# Patient Record
Sex: MALE | Race: WHITE | NOT HISPANIC OR LATINO
[De-identification: names, ages, dates, MRNs, and addresses within clinical notes are randomized per-mention and may not be internally consistent; named-entity substitution may affect disease eponyms.]

---

## 2019-01-30 PROBLEM — Z00.00 ENCOUNTER FOR PREVENTIVE HEALTH EXAMINATION: Status: ACTIVE | Noted: 2019-01-30

## 2019-02-01 ENCOUNTER — RECORD ABSTRACTING (OUTPATIENT)
Age: 59
End: 2019-02-01

## 2019-02-01 DIAGNOSIS — R14.0 ABDOMINAL DISTENSION (GASEOUS): ICD-10-CM

## 2019-02-01 DIAGNOSIS — Z86.79 PERSONAL HISTORY OF OTHER DISEASES OF THE CIRCULATORY SYSTEM: ICD-10-CM

## 2019-02-01 DIAGNOSIS — R93.89 ABNORMAL FINDINGS ON DIAGNOSTIC IMAGING OF OTHER SPECIFIED BODY STRUCTURES: ICD-10-CM

## 2019-02-01 DIAGNOSIS — Z86.39 PERSONAL HISTORY OF OTHER ENDOCRINE, NUTRITIONAL AND METABOLIC DISEASE: ICD-10-CM

## 2019-02-01 DIAGNOSIS — R10.32 LEFT LOWER QUADRANT PAIN: ICD-10-CM

## 2019-02-01 DIAGNOSIS — Z78.9 OTHER SPECIFIED HEALTH STATUS: ICD-10-CM

## 2019-02-01 RX ORDER — MONTELUKAST SODIUM 10 MG/1
10 TABLET, FILM COATED ORAL
Refills: 0 | Status: ACTIVE | COMMUNITY

## 2019-02-01 RX ORDER — TIOTROPIUM BROMIDE INHALATION SPRAY 3.12 UG/1
2.5 SPRAY, METERED RESPIRATORY (INHALATION) DAILY
Refills: 0 | Status: ACTIVE | COMMUNITY

## 2019-02-01 RX ORDER — METFORMIN HYDROCHLORIDE 1000 MG/1
1000 TABLET, COATED ORAL
Refills: 0 | Status: ACTIVE | COMMUNITY

## 2019-02-01 RX ORDER — ATORVASTATIN CALCIUM 40 MG/1
40 TABLET, FILM COATED ORAL DAILY
Refills: 0 | Status: ACTIVE | COMMUNITY

## 2019-02-01 RX ORDER — ALBUTEROL SULFATE 90 UG/1
108 (90 BASE) AEROSOL, METERED RESPIRATORY (INHALATION)
Refills: 0 | Status: ACTIVE | COMMUNITY

## 2019-02-01 RX ORDER — TAPENTADOL HYDROCHLORIDE 75 MG/1
75 TABLET, FILM COATED ORAL
Refills: 0 | Status: ACTIVE | COMMUNITY

## 2019-02-01 RX ORDER — FLUTICASONE PROPIONATE AND SALMETEROL 50; 500 UG/1; UG/1
500-50 POWDER RESPIRATORY (INHALATION) TWICE DAILY
Refills: 0 | Status: ACTIVE | COMMUNITY

## 2019-02-01 RX ORDER — TRIMETHOBENZAMIDE HYDROCHLORIDE 300 MG/1
300 CAPSULE ORAL 3 TIMES DAILY
Refills: 0 | Status: ACTIVE | COMMUNITY

## 2019-02-01 RX ORDER — PANTOPRAZOLE 40 MG/1
40 TABLET, DELAYED RELEASE ORAL DAILY
Refills: 0 | Status: ACTIVE | COMMUNITY

## 2019-02-01 RX ORDER — NITROGLYCERIN 0.4 MG/1
0.4 TABLET SUBLINGUAL
Refills: 0 | Status: ACTIVE | COMMUNITY

## 2019-02-01 RX ORDER — KETOROLAC TROMETHAMINE 4 MG/ML
0.4 SOLUTION/ DROPS OPHTHALMIC 4 TIMES DAILY
Refills: 0 | Status: ACTIVE | COMMUNITY

## 2019-02-06 ENCOUNTER — TRANSCRIPTION ENCOUNTER (OUTPATIENT)
Age: 59
End: 2019-02-06

## 2019-02-06 ENCOUNTER — APPOINTMENT (OUTPATIENT)
Dept: GASTROENTEROLOGY | Facility: CLINIC | Age: 59
End: 2019-02-06
Payer: MEDICARE

## 2019-02-06 VITALS
HEART RATE: 74 BPM | DIASTOLIC BLOOD PRESSURE: 80 MMHG | BODY MASS INDEX: 31.83 KG/M2 | SYSTOLIC BLOOD PRESSURE: 128 MMHG | HEIGHT: 68 IN | WEIGHT: 210 LBS

## 2019-02-06 PROCEDURE — 99214 OFFICE O/P EST MOD 30 MIN: CPT

## 2019-02-06 RX ORDER — SAXAGLIPTIN 5 MG/1
5 TABLET, FILM COATED ORAL
Refills: 0 | Status: DISCONTINUED | COMMUNITY
End: 2019-02-06

## 2019-02-06 RX ORDER — FEXOFENADINE HYDROCHLORIDE 180 MG/1
180 TABLET ORAL
Refills: 0 | Status: DISCONTINUED | COMMUNITY
End: 2019-02-06

## 2019-02-06 RX ORDER — GLIPIZIDE 5 MG/1
5 TABLET ORAL
Refills: 0 | Status: ACTIVE | COMMUNITY

## 2019-02-06 RX ORDER — DICYCLOMINE HYDROCHLORIDE 10 MG/5ML
SOLUTION ORAL
Refills: 0 | Status: DISCONTINUED | COMMUNITY
End: 2019-02-06

## 2019-02-06 RX ORDER — RIFAXIMIN 550 MG/1
550 TABLET ORAL
Refills: 0 | Status: DISCONTINUED | COMMUNITY
End: 2019-02-06

## 2019-02-06 RX ORDER — CETIRIZINE HYDROCHLORIDE 10 MG/1
10 TABLET, FILM COATED ORAL
Refills: 0 | Status: ACTIVE | COMMUNITY

## 2019-02-06 RX ORDER — FLUTICASONE FUROATE AND VILANTEROL TRIFENATATE 50; 25 UG/1; UG/1
POWDER RESPIRATORY (INHALATION)
Refills: 0 | Status: DISCONTINUED | COMMUNITY
End: 2019-02-06

## 2019-02-06 RX ORDER — DOMPERIDONE
POWDER (GRAM) MISCELLANEOUS
Refills: 0 | Status: DISCONTINUED | COMMUNITY
End: 2019-02-06

## 2019-02-06 RX ORDER — AZELASTINE HYDROCHLORIDE AND FLUTICASONE PROPIONATE 137; 50 UG/1; UG/1
137-50 SPRAY, METERED NASAL
Refills: 0 | Status: ACTIVE | COMMUNITY

## 2019-02-06 RX ORDER — VALSARTAN AND HYDROCHLOROTHIAZIDE 160; 12.5 MG/1; MG/1
160-12.5 TABLET, FILM COATED ORAL
Refills: 0 | Status: ACTIVE | COMMUNITY

## 2019-02-06 RX ORDER — ALBUTEROL SULFATE 1.25 MG/3ML
1.25 SOLUTION RESPIRATORY (INHALATION)
Refills: 0 | Status: ACTIVE | COMMUNITY

## 2019-02-06 NOTE — HISTORY OF PRESENT ILLNESS
[de-identified] : Called with a complaint of severe  / intermittent LUQ abdominal pain  x 1 year. The pain is associated with position changes and radiates to / from his back. Has a known h/o lumbar / cervical back disease.  Denies nausea, vomiting, fever, chills, diarrhea, constipation, melena, hematemesis. Pain mild at present.  Labs faxed to the office were reviewed by me and were notable for a normal amylase  ( 1/2019, 12/2018 / 4/2018). Elevated Lipase = 58 ( 1/2019), 92 ( 12/2018), 216 ( 4/2018). Normal AST / ALT / ALK phos / bilirubin ( 1/2019 / 12/2018 / 10/2018) .   Normal  ( 12/2018). Normal CBC. Elevated HgA1c ( 7.4). BUN / Cr = 11/1 and 17/ 1.4( normal) . Normal CRP ( 12/31/18). Admits to excessive ETOH intake in 12/2018 immediately prior to daughters brain surgery. Abstinent for the past month or so.\par Seen in 2017 with  a complaint of bloating / RLQ pain for  4-5 months (noticed only when lying on rt. side). CCK-HIDA ( 5/2017)  revealed non emptying gallbladder at 30 min.  EGD ( 4/2017)  notable for Gastritis / esophagitis.  Sonogram ( 4/2017)  revealed a fatty liver, prominent GB and a renal cyst ( yanely lymph nodes seen on CT scan not assessed on this sonogram).   CT scan  ( 2/2017)  fatty liver / enlarged lymph nodes in the yanely / distended GB. Additionally diagnosed with gastroparesis ( T 1/2 = 343) started on domperidone by Dr. Schoen ( GI) and SIBO (small intestinal bacterial overgrowth). For his SIBO he was treated with Xifaxan 550 po TID for 2 weeks with good response. Placed on Xifaxan 550 po TID daily for the last 3 months when his sxs returned. Labs from 1/29/17 were unremarkable. Seen in 1/2016 with a 1 year c/o intermittent LLQ pain radiating to the left lower back with a burning sensation radiating down his left leg. Evaluation revealed a musculoskeletal etiology ( relieved with an epidural) . Colonoscopy in 3/2017 revealed diverticulosis and internal hemorrhoids. \par \par

## 2019-02-06 NOTE — PHYSICAL EXAM
[General Appearance - Alert] : alert [General Appearance - In No Acute Distress] : in no acute distress [Sclera] : the sclera and conjunctiva were normal [Outer Ear] : the ears and nose were normal in appearance [Neck Appearance] : the appearance of the neck was normal [] : no respiratory distress [Abdomen Soft] : soft [FreeTextEntry1] : deferred [No CVA Tenderness] : no ~M costovertebral angle tenderness [Abnormal Walk] : normal gait [Skin Color & Pigmentation] : normal skin color and pigmentation [No Focal Deficits] : no focal deficits [Oriented To Time, Place, And Person] : oriented to person, place, and time

## 2019-02-06 NOTE — ASSESSMENT
[FreeTextEntry1] : 1. LUQ Flank / Abdominal pain: Diagnosed with both gastroparesis and biliary dyskinesia in the past. However pain is in Left flank radiating to back and appears positional. Suspect musculoskeletal etiology. Recommended f/u with his back surgeon for evaluation\par \par

## 2019-02-06 NOTE — CONSULT LETTER
[Dear  ___] : Dear  [unfilled], [Courtesy Letter:] : I had the pleasure of seeing your patient, [unfilled], in my office today. [Please see my note below.] : Please see my note below. [Consult Closing:] : Thank you very much for allowing me to participate in the care of this patient.  If you have any questions, please do not hesitate to contact me. [Sincerely,] : Sincerely, [FreeTextEntry3] : Axel Ibarra MD\par tel: [phone]\par fax: [phone]\par

## 2019-08-05 PROBLEM — R10.32 LEFT LOWER QUADRANT PAIN: Status: ACTIVE | Noted: 2019-02-01

## 2023-03-03 ENCOUNTER — NON-APPOINTMENT (OUTPATIENT)
Age: 63
End: 2023-03-03

## 2023-03-03 ENCOUNTER — APPOINTMENT (OUTPATIENT)
Dept: GASTROENTEROLOGY | Facility: CLINIC | Age: 63
End: 2023-03-03
Payer: COMMERCIAL

## 2023-03-03 VITALS
OXYGEN SATURATION: 97 % | SYSTOLIC BLOOD PRESSURE: 132 MMHG | BODY MASS INDEX: 18.4 KG/M2 | HEART RATE: 56 BPM | WEIGHT: 121 LBS | DIASTOLIC BLOOD PRESSURE: 84 MMHG

## 2023-03-03 DIAGNOSIS — R10.9 UNSPECIFIED ABDOMINAL PAIN: ICD-10-CM

## 2023-03-03 DIAGNOSIS — R11.0 NAUSEA: ICD-10-CM

## 2023-03-03 DIAGNOSIS — K59.09 OTHER CONSTIPATION: ICD-10-CM

## 2023-03-03 PROCEDURE — 99205 OFFICE O/P NEW HI 60 MIN: CPT

## 2023-03-03 RX ORDER — ALUMINUM HYDROXIDE AND MAGNESIUM TRISILICATE 80; 14.2 MG/1; MG/1
80-14.2 TABLET, CHEWABLE ORAL
Refills: 0 | Status: DISCONTINUED | COMMUNITY
End: 2023-03-03

## 2023-03-03 RX ORDER — DILTIAZEM HYDROCHLORIDE 360 MG/1
360 CAPSULE, EXTENDED RELEASE ORAL
Refills: 0 | Status: DISCONTINUED | COMMUNITY
End: 2023-03-03

## 2023-03-03 NOTE — HISTORY OF PRESENT ILLNESS
[de-identified] : Presents  with a 2.5  month complaint of  increasing constipation ( small  / hard stools)  LUQ pain radiating to left back / intermittent nausea. Denies melena, hematemesis, BRBPR, emesis.  Currently ETOH abstinebt x 4 years. Diabetes poorly controlled at present. \par \par Labs reviewed from 3/1/23 notable  for  Normal BUN / Cr, elevated glucose / lpase 247 / Amylase 131 /A1c = 9.2 / TB = 1.5 / normal ALK phos, AST, ALT\par \par Last evaluated in 2019 for  a complaint of severe  / intermittent LUQ abdominal pain  x 1 year. The pain was  associated with position changes and radiated to / from his back. Has a known h/o lumbar / cervical back disease.  Back evaluation recommended\par \par Denies nausea, vomiting, fever, chills, diarrhea, constipation, melena, hematemesis. \par \par  Labs faxed to the office previously were reviewed by me and were notable for a normal amylase  ( 1/2019, 12/2018 / 4/2018). Elevated Lipase = 58 ( 1/2019), 92 ( 12/2018), 216 ( 4/2018). Normal AST / ALT / ALK phos / bilirubin ( 1/2019 / 12/2018 / 10/2018) .   Normal  ( 12/2018). Normal CBC. Elevated HgA1c ( 7.4). BUN / Cr = 11/1 and 17/ 1.4( normal) . Normal CRP ( 12/31/18). Admits to excessive ETOH intake in 12/2018 immediately prior to daughters brain surgery.\par \par Seen in 2017 with  a complaint of bloating / RLQ pain for  4-5 months (noticed only when lying on rt. side). \par \par -CCK-HIDA ( 5/2017)  revealed non emptying gallbladder at 30 min.  \par -EGD ( 4/2017)  notable for Gastritis / esophagitis.  \par -Sonogram ( 4/2017)  revealed a fatty liver, prominent GB and a renal cyst ( yanely lymph nodes seen on CT scan not assessed on this sonogram).  \par - CT scan  ( 2/2017)  fatty liver / enlarged lymph nodes in the yanely / distended GB. \par \par Additionally diagnosed with gastroparesis ( T 1/2 = 343) started on domperidone by Dr. Schoen (GI) and JOSE ROBERTO. For his SIBO he was treated with Xifaxan 550 po TID for 2 weeks with good response. . Labs from 1/29/17 were unremarkable. \par \par Seen in 1/2016 with a 1 year c/o intermittent LLQ pain radiating to the left lower back with a burning sensation radiating down his left leg. Evaluation revealed a musculoskeletal etiology ( relieved with an epidural) . \par \par -Colonoscopy in 3/2017 revealed diverticulosis and internal hemorrhoids. \par \par

## 2023-03-03 NOTE — CONSULT LETTER
[Dear  ___] : Dear  [unfilled], [Courtesy Letter:] : I had the pleasure of seeing your patient, [unfilled], in my office today. [Please see my note below.] : Please see my note below. [Consult Closing:] : Thank you very much for allowing me to participate in the care of this patient.  If you have any questions, please do not hesitate to contact me. [Sincerely,] : Sincerely, [FreeTextEntry3] : Axel Ibarra MD\par tel: [phone]\par fax: [phone]\par

## 2023-03-03 NOTE — ASSESSMENT
[FreeTextEntry1] : 1. LUQ Flank / Abdominal pain: Diagnosed with both gastroparesis and biliary dyskinesia in the past.  CT planed\par \par 2. Constipation ( new):  Colonoscopy scheduled\par \par 3. Nausea: EGD planned ...if unremarkable  with consider repeat GES and CCK - HIDA\par \par Pertinent available records reviewed\par Risks of the procedures including but not limited to bleeding / perforation / infection / anesthesia complication / missed  lesions explained to the  patient . The patient expressed understanding and a desire to proceed with the procedures.\par \par Risk of not doing procedures includes but is not limited to missed or delayed diagnosis of gastric pathology, colon cancer or other gastrointestinal pathology\par \par The patient is at increased risk of anesthesia / procedure related complications  secondary to DM / elevated BMI\par Pertinent available records reviewed\par

## 2023-03-22 ENCOUNTER — RESULT REVIEW (OUTPATIENT)
Age: 63
End: 2023-03-22

## 2023-03-23 NOTE — REVIEW OF SYSTEMS
[Joint Pain] : joint pain [Negative] : Heme/Lymph Our Emergency Department Referral Coordinators will be reaching out to you in the next 24-48 hours from 9:00am to 5:00pm with a follow up appointment. Please expect a phone call from the hospital in that time frame. If you do not receive a call or if you have any questions or concerns, you can reach them at   [phone]      Diverticulitis    Diverticulitis is inflammation or infection of small pouches in your colon that form when you HAVE a condition called diverticulosis. This condition can range from mild to severe potentially leading to perforation or obstructions of your colon. Symptoms include abdominal pain, fever/chills, nausea, vomiting, diarrhea, constipation, or blood in your stool. If you were prescribed an antibiotic medicine, take it as told by your health care provider. Do not stop taking the antibiotic even if you start to feel better.    SEEK IMMEDIATE MEDICAL CARE IF YOU HAVE ANY OF THE FOLLOWING SYMPTOMS: worsening abdominal pain, high fever, inability to hold down liquids or medication, black or bloody stools, inability to pass gas, lightheadedness/dizziness, or a change in mental status.

## 2023-03-25 ENCOUNTER — RESULT REVIEW (OUTPATIENT)
Age: 63
End: 2023-03-25

## 2023-03-28 ENCOUNTER — NON-APPOINTMENT (OUTPATIENT)
Age: 63
End: 2023-03-28

## 2023-04-17 ENCOUNTER — RESULT REVIEW (OUTPATIENT)
Age: 63
End: 2023-04-17

## 2023-04-18 ENCOUNTER — APPOINTMENT (OUTPATIENT)
Dept: GASTROENTEROLOGY | Facility: HOSPITAL | Age: 63
End: 2023-04-18

## 2024-10-04 ENCOUNTER — APPOINTMENT (OUTPATIENT)
Dept: GASTROENTEROLOGY | Facility: CLINIC | Age: 64
End: 2024-10-04
Payer: COMMERCIAL

## 2024-10-04 VITALS
HEIGHT: 65 IN | DIASTOLIC BLOOD PRESSURE: 80 MMHG | WEIGHT: 225 LBS | SYSTOLIC BLOOD PRESSURE: 130 MMHG | BODY MASS INDEX: 37.49 KG/M2

## 2024-10-04 DIAGNOSIS — R11.0 NAUSEA: ICD-10-CM

## 2024-10-04 DIAGNOSIS — R10.9 UNSPECIFIED ABDOMINAL PAIN: ICD-10-CM

## 2024-10-04 DIAGNOSIS — K31.84 GASTROPARESIS: ICD-10-CM

## 2024-10-04 PROCEDURE — 99215 OFFICE O/P EST HI 40 MIN: CPT

## 2024-10-04 PROCEDURE — G2211 COMPLEX E/M VISIT ADD ON: CPT | Mod: NC

## 2024-10-04 RX ORDER — INSULIN ASPART 100 [IU]/ML
INJECTION, SOLUTION INTRAVENOUS; SUBCUTANEOUS
Refills: 0 | Status: ACTIVE | COMMUNITY

## 2024-10-04 RX ORDER — ROSUVASTATIN CALCIUM 5 MG/1
TABLET, FILM COATED ORAL
Refills: 0 | Status: ACTIVE | COMMUNITY

## 2024-10-04 NOTE — PHYSICAL EXAM
[Alert] : alert [Sclera] : the sclera and conjunctiva were normal [Normal Appearance] : the appearance of the neck was normal [No Respiratory Distress] : no respiratory distress [Abdomen Soft] : soft [Abnormal Walk] : normal gait [Normal Color / Pigmentation] : normal skin color and pigmentation [Cranial Nerves Intact] : cranial nerves 2-12 were intact [Oriented To Time, Place, And Person] : oriented to person, place, and time

## 2024-10-04 NOTE — HISTORY OF PRESENT ILLNESS
[FreeTextEntry1] : Presents with a 2-3 month concern re: decreased energy. Started after doubling his BP meds. Also with nausea and LUQ pain radiating to groin and back ( increased with movement / most noticeable after sleeping on left side) . Denies melena, hematemesis, fever, chills, diarrhea, constipation  Last evaluated 5/2023 with a 2.5 month complaint of increasing constipation ( small / hard stools) LUQ pain radiating to left back / intermittent nausea. Denied melena, hematemesis, BRBPR, emesis.  Diabetes poorly controlled at present.  CT IMPRESSION: 3/2023  Gallbladder is dilated up to 11 cm in long axis x 6 cm in short axis, similar in degree to that evident on prior CT of 2/21/2017. No intraluminal calcifications or pericholecystic fluid. No biliary duct dilatation.  Large amount of fecal material throughout transverse colon and descending colon without evidence of bowel obstruction.  - EGD / colonoscopy 4/2023: esophagitis / gastritis / hemorrhoids / diverticulosis   Labs reviewed from 3/1/23 notable for Normal BUN / Cr, elevated glucose / lpase 247 / Amylase 131 /A1c = 9.2 / TB = 1.5 / normal ALK phos, AST, ALT  Evaluated in 2019 for a complaint of severe / intermittent LUQ abdominal pain x 1 year. The pain was associated with position changes and radiated to / from his back. Has a known h/o lumbar / cervical back disease. Back evaluation recommended   Labs faxed to the office previously were reviewed by me and were notable for a normal amylase ( 1/2019, 12/2018 / 4/2018). Elevated Lipase = 58 ( 1/2019), 92 ( 12/2018), 216 ( 4/2018). Normal AST / ALT / ALK phos / bilirubin ( 1/2019 / 12/2018 / 10/2018). Normal  ( 12/2018). Normal CBC. Elevated HgA1c ( 7.4). BUN / Cr = 11/1 and 17/ 1.4( normal). Normal CRP ( 12/31/18). Admits to excessive ETOH intake in 12/2018 immediately prior to daughters brain surgery.   -CCK-HIDA ( 5/2017) revealed non emptying gallbladder at 30 min. ( cholecystectomy recommended then)   -EGD ( 4/2017) notable for Gastritis / esophagitis.  -Sonogram ( 4/2017) revealed a fatty liver, prominent GB and a renal cyst ( yanely lymph nodes seen on CT scan not assessed on this sonogram).  - CT scan ( 2/2017) fatty liver / enlarged lymph nodes in the yanely / distended GB.   Additionally diagnosed with gastroparesis ( T 1/2 = 343) started on domperidone by Dr. Schoen (GI) and SIBO. For his SIBO he was treated with Xifaxan 550 po TID for 2 weeks with good response.. Labs from 1/29/17 were unremarkable.  Seen in 1/2016 with a 1 year c/o intermittent LLQ pain radiating to the left lower back with a burning sensation radiating down his left leg. Evaluation revealed a musculoskeletal etiology ( relieved with an epidural).  -Colonoscopy in 3/2017 revealed diverticulosis and internal hemorrhoids.

## 2024-10-04 NOTE — ASSESSMENT
[FreeTextEntry1] : 1. LUQ pain:  probably musculoskeletal. Recommended re-evaluation with his back MD  2. Post prandial nausea:  gastroparesis vs meds...will repeat GES   3. Decreased energy since increasing BP meds...patient colby discus with his PMD  Pertinent available records reviewed .

## 2024-10-29 ENCOUNTER — RESULT REVIEW (OUTPATIENT)
Age: 64
End: 2024-10-29

## 2024-11-01 ENCOUNTER — NON-APPOINTMENT (OUTPATIENT)
Age: 64
End: 2024-11-01

## 2024-11-01 DIAGNOSIS — R10.9 UNSPECIFIED ABDOMINAL PAIN: ICD-10-CM

## 2024-12-11 ENCOUNTER — RESULT REVIEW (OUTPATIENT)
Age: 64
End: 2024-12-11

## 2024-12-11 ENCOUNTER — NON-APPOINTMENT (OUTPATIENT)
Age: 64
End: 2024-12-11

## 2024-12-18 ENCOUNTER — APPOINTMENT (OUTPATIENT)
Dept: SURGERY | Facility: CLINIC | Age: 64
End: 2024-12-18
Payer: COMMERCIAL

## 2024-12-18 VITALS
TEMPERATURE: 97.9 F | HEIGHT: 68 IN | SYSTOLIC BLOOD PRESSURE: 150 MMHG | DIASTOLIC BLOOD PRESSURE: 87 MMHG | HEART RATE: 68 BPM | BODY MASS INDEX: 35.92 KG/M2 | OXYGEN SATURATION: 97 % | WEIGHT: 237 LBS

## 2024-12-18 DIAGNOSIS — R10.9 UNSPECIFIED ABDOMINAL PAIN: ICD-10-CM

## 2024-12-18 DIAGNOSIS — R11.0 NAUSEA: ICD-10-CM

## 2024-12-18 PROCEDURE — 99204 OFFICE O/P NEW MOD 45 MIN: CPT

## 2025-01-08 ENCOUNTER — RESULT REVIEW (OUTPATIENT)
Age: 65
End: 2025-01-08

## 2025-01-10 ENCOUNTER — TRANSCRIPTION ENCOUNTER (OUTPATIENT)
Age: 65
End: 2025-01-10

## 2025-01-15 ENCOUNTER — APPOINTMENT (OUTPATIENT)
Dept: SURGERY | Facility: CLINIC | Age: 65
End: 2025-01-15

## 2025-01-16 ENCOUNTER — APPOINTMENT (OUTPATIENT)
Dept: ORTHOPEDIC SURGERY | Facility: CLINIC | Age: 65
End: 2025-01-16
Payer: COMMERCIAL

## 2025-01-16 PROBLEM — M54.16 LUMBAR RADICULOPATHY, ACUTE: Status: ACTIVE | Noted: 2025-01-16

## 2025-01-16 PROBLEM — M43.10 SPONDYLOLISTHESIS, ACQUIRED: Status: ACTIVE | Noted: 2025-01-16

## 2025-01-16 PROCEDURE — 99205 OFFICE O/P NEW HI 60 MIN: CPT

## 2025-01-22 ENCOUNTER — APPOINTMENT (OUTPATIENT)
Dept: NEUROSURGERY | Facility: CLINIC | Age: 65
End: 2025-01-22
Payer: COMMERCIAL

## 2025-01-22 ENCOUNTER — NON-APPOINTMENT (OUTPATIENT)
Age: 65
End: 2025-01-22

## 2025-01-22 VITALS — DIASTOLIC BLOOD PRESSURE: 80 MMHG | SYSTOLIC BLOOD PRESSURE: 122 MMHG | OXYGEN SATURATION: 94 % | HEART RATE: 62 BPM

## 2025-01-22 DIAGNOSIS — M54.16 RADICULOPATHY, LUMBAR REGION: ICD-10-CM

## 2025-01-22 DIAGNOSIS — M43.10 SPONDYLOLISTHESIS, SITE UNSPECIFIED: ICD-10-CM

## 2025-01-22 DIAGNOSIS — M47.27 OTHER SPONDYLOSIS WITH RADICULOPATHY, LUMBOSACRAL REGION: ICD-10-CM

## 2025-01-22 PROCEDURE — 99205 OFFICE O/P NEW HI 60 MIN: CPT

## 2025-01-23 RX ORDER — OXYCODONE 5 MG/1
5 TABLET ORAL
Qty: 60 | Refills: 0 | Status: ACTIVE | COMMUNITY
Start: 2025-01-23 | End: 1900-01-01

## 2025-01-23 RX ORDER — TIZANIDINE 4 MG/1
4 TABLET ORAL 3 TIMES DAILY
Qty: 30 | Refills: 2 | Status: ACTIVE | COMMUNITY
Start: 2025-01-23 | End: 1900-01-01

## 2025-01-23 RX ORDER — GABAPENTIN 300 MG/1
300 CAPSULE ORAL
Qty: 30 | Refills: 4 | Status: ACTIVE | COMMUNITY
Start: 2025-01-23 | End: 1900-01-01

## 2025-01-29 ENCOUNTER — RESULT REVIEW (OUTPATIENT)
Age: 65
End: 2025-01-29

## 2025-02-12 ENCOUNTER — APPOINTMENT (OUTPATIENT)
Dept: NEUROSURGERY | Facility: HOSPITAL | Age: 65
End: 2025-02-12

## 2025-02-12 ENCOUNTER — APPOINTMENT (OUTPATIENT)
Dept: ORTHOPEDIC SURGERY | Facility: HOSPITAL | Age: 65
End: 2025-02-12

## 2025-02-12 ENCOUNTER — RESULT REVIEW (OUTPATIENT)
Age: 65
End: 2025-02-12

## 2025-02-12 PROCEDURE — 22853 INSJ BIOMECHANICAL DEVICE: CPT

## 2025-02-12 PROCEDURE — 22633 ARTHRD CMBN 1NTRSPC LUMBAR: CPT | Mod: 62

## 2025-02-12 PROCEDURE — 22840 INSERT SPINE FIXATION DEVICE: CPT

## 2025-02-12 PROCEDURE — 63047 LAM FACETEC & FORAMOT LUMBAR: CPT | Mod: 62

## 2025-02-12 PROCEDURE — 61783 SCAN PROC SPINAL: CPT | Mod: 59

## 2025-02-12 PROCEDURE — 63048 LAM FACETEC &FORAMOT EA ADDL: CPT | Mod: 62

## 2025-02-12 PROCEDURE — 63052 LAM FACETC/FRMT ARTHRD LUM 1: CPT | Mod: 59

## 2025-02-13 ENCOUNTER — RESULT REVIEW (OUTPATIENT)
Age: 65
End: 2025-02-13

## 2025-02-15 ENCOUNTER — TRANSCRIPTION ENCOUNTER (OUTPATIENT)
Age: 65
End: 2025-02-15

## 2025-02-24 RX ORDER — DIAZEPAM 5 MG/1
5 TABLET ORAL
Qty: 30 | Refills: 0 | Status: ACTIVE | COMMUNITY
Start: 2025-02-24 | End: 1900-01-01

## 2025-03-05 ENCOUNTER — APPOINTMENT (OUTPATIENT)
Facility: CLINIC | Age: 65
End: 2025-03-05
Payer: COMMERCIAL

## 2025-03-05 ENCOUNTER — RESULT REVIEW (OUTPATIENT)
Age: 65
End: 2025-03-05

## 2025-03-05 VITALS
BODY MASS INDEX: 33.65 KG/M2 | HEIGHT: 68 IN | HEART RATE: 70 BPM | WEIGHT: 222 LBS | SYSTOLIC BLOOD PRESSURE: 120 MMHG | DIASTOLIC BLOOD PRESSURE: 80 MMHG | OXYGEN SATURATION: 95 %

## 2025-03-05 DIAGNOSIS — M43.10 SPONDYLOLISTHESIS, SITE UNSPECIFIED: ICD-10-CM

## 2025-03-05 DIAGNOSIS — M54.16 RADICULOPATHY, LUMBAR REGION: ICD-10-CM

## 2025-03-05 PROCEDURE — 72100 X-RAY EXAM L-S SPINE 2/3 VWS: CPT | Mod: 26

## 2025-03-05 PROCEDURE — 99024 POSTOP FOLLOW-UP VISIT: CPT

## 2025-04-30 DIAGNOSIS — R19.7 DIARRHEA, UNSPECIFIED: ICD-10-CM

## 2025-05-07 ENCOUNTER — APPOINTMENT (OUTPATIENT)
Facility: CLINIC | Age: 65
End: 2025-05-07
Payer: COMMERCIAL

## 2025-05-07 ENCOUNTER — RESULT REVIEW (OUTPATIENT)
Age: 65
End: 2025-05-07

## 2025-05-07 VITALS — WEIGHT: 223 LBS | HEIGHT: 68 IN | BODY MASS INDEX: 33.8 KG/M2

## 2025-05-07 DIAGNOSIS — M43.10 SPONDYLOLISTHESIS, SITE UNSPECIFIED: ICD-10-CM

## 2025-05-07 DIAGNOSIS — M47.27 OTHER SPONDYLOSIS WITH RADICULOPATHY, LUMBOSACRAL REGION: ICD-10-CM

## 2025-05-07 DIAGNOSIS — M54.16 RADICULOPATHY, LUMBAR REGION: ICD-10-CM

## 2025-05-07 LAB
CDIFF BY PCR: NOT DETECTED
GI PCR PANEL: NOT DETECTED

## 2025-05-07 PROCEDURE — 99024 POSTOP FOLLOW-UP VISIT: CPT

## 2025-05-07 PROCEDURE — 72110 X-RAY EXAM L-2 SPINE 4/>VWS: CPT | Mod: 26

## 2025-05-08 LAB — DEPRECATED O AND P PREP STL: NORMAL

## 2025-05-09 LAB — CALPROTECTIN FECAL: 6 UG/G

## 2025-07-02 ENCOUNTER — APPOINTMENT (OUTPATIENT)
Dept: GASTROENTEROLOGY | Facility: CLINIC | Age: 65
End: 2025-07-02

## 2025-08-14 ENCOUNTER — APPOINTMENT (OUTPATIENT)
Dept: ORTHOPEDIC SURGERY | Facility: CLINIC | Age: 65
End: 2025-08-14
Payer: COMMERCIAL

## 2025-08-14 DIAGNOSIS — M43.10 SPONDYLOLISTHESIS, SITE UNSPECIFIED: ICD-10-CM

## 2025-08-14 PROCEDURE — 99213 OFFICE O/P EST LOW 20 MIN: CPT

## 2025-08-14 PROCEDURE — 72110 X-RAY EXAM L-2 SPINE 4/>VWS: CPT
